# Patient Record
Sex: FEMALE | Race: WHITE | NOT HISPANIC OR LATINO | URBAN - NONMETROPOLITAN AREA
[De-identification: names, ages, dates, MRNs, and addresses within clinical notes are randomized per-mention and may not be internally consistent; named-entity substitution may affect disease eponyms.]

---

## 2020-03-02 ENCOUNTER — APPOINTMENT (RX ONLY)
Dept: URBAN - NONMETROPOLITAN AREA CLINIC 13 | Facility: CLINIC | Age: 35
Setting detail: DERMATOLOGY
End: 2020-03-02

## 2020-03-02 DIAGNOSIS — D18.0 HEMANGIOMA: ICD-10-CM

## 2020-03-02 DIAGNOSIS — L57.8 OTHER SKIN CHANGES DUE TO CHRONIC EXPOSURE TO NONIONIZING RADIATION: ICD-10-CM

## 2020-03-02 DIAGNOSIS — L82.1 OTHER SEBORRHEIC KERATOSIS: ICD-10-CM

## 2020-03-02 DIAGNOSIS — D22 MELANOCYTIC NEVI: ICD-10-CM

## 2020-03-02 DIAGNOSIS — D485 NEOPLASM OF UNCERTAIN BEHAVIOR OF SKIN: ICD-10-CM | Status: WORSENING

## 2020-03-02 PROBLEM — D22.39 MELANOCYTIC NEVI OF OTHER PARTS OF FACE: Status: ACTIVE | Noted: 2020-03-02

## 2020-03-02 PROBLEM — D18.01 HEMANGIOMA OF SKIN AND SUBCUTANEOUS TISSUE: Status: ACTIVE | Noted: 2020-03-02

## 2020-03-02 PROBLEM — D48.5 NEOPLASM OF UNCERTAIN BEHAVIOR OF SKIN: Status: ACTIVE | Noted: 2020-03-02

## 2020-03-02 PROBLEM — D22.4 MELANOCYTIC NEVI OF SCALP AND NECK: Status: ACTIVE | Noted: 2020-03-02

## 2020-03-02 PROCEDURE — ? BIOPSY BY SHAVE METHOD

## 2020-03-02 PROCEDURE — 11102 TANGNTL BX SKIN SINGLE LES: CPT

## 2020-03-02 PROCEDURE — ? COUNSELING

## 2020-03-02 PROCEDURE — 99203 OFFICE O/P NEW LOW 30 MIN: CPT | Mod: 25

## 2020-03-02 ASSESSMENT — LOCATION DETAILED DESCRIPTION DERM
LOCATION DETAILED: RIGHT INFERIOR UPPER BACK
LOCATION DETAILED: LEFT BUTTOCK
LOCATION DETAILED: RIGHT SUPERIOR MEDIAL MIDBACK
LOCATION DETAILED: RIGHT CENTRAL PARIETAL SCALP
LOCATION DETAILED: RIGHT SUPERIOR CENTRAL MALAR CHEEK
LOCATION DETAILED: RIGHT INFERIOR CENTRAL MALAR CHEEK
LOCATION DETAILED: LEFT MID-UPPER BACK
LOCATION DETAILED: EPIGASTRIC SKIN
LOCATION DETAILED: INFERIOR THORACIC SPINE
LOCATION DETAILED: LEFT INFERIOR CENTRAL MALAR CHEEK

## 2020-03-02 ASSESSMENT — LOCATION SIMPLE DESCRIPTION DERM
LOCATION SIMPLE: ABDOMEN
LOCATION SIMPLE: RIGHT LOWER BACK
LOCATION SIMPLE: RIGHT CHEEK
LOCATION SIMPLE: LEFT UPPER BACK
LOCATION SIMPLE: LEFT CHEEK
LOCATION SIMPLE: RIGHT UPPER BACK
LOCATION SIMPLE: UPPER BACK
LOCATION SIMPLE: LEFT BUTTOCK
LOCATION SIMPLE: SCALP
LOCATION SIMPLE: RIGHT CHEEK

## 2020-03-02 ASSESSMENT — LOCATION ZONE DERM
LOCATION ZONE: TRUNK
LOCATION ZONE: SCALP
LOCATION ZONE: FACE
LOCATION ZONE: FACE
LOCATION ZONE: TRUNK

## 2021-02-12 NOTE — PROCEDURE: BIOPSY BY SHAVE METHOD
Detail Level: Detailed
Depth Of Biopsy: dermis
Was A Bandage Applied: Yes
Size Of Lesion In Cm: 0.6
56.6
X Size Of Lesion In Cm: 0.4
Biopsy Type: H and E
Biopsy Method: Dermablade
Anesthesia Type: 1% lidocaine with epinephrine
Anesthesia Volume In Cc: 0.5
Additional Anesthesia Volume In Cc (Will Not Render If 0): 0
Hemostasis: Drysol
Wound Care: Petrolatum
Dressing: bandage
Destruction After The Procedure: No
Type Of Destruction Used: Curettage
Curettage Text: The wound bed was treated with curettage after the biopsy was performed.
Cryotherapy Text: The wound bed was treated with cryotherapy after the biopsy was performed.
Electrodesiccation Text: The wound bed was treated with electrodesiccation after the biopsy was performed.
Electrodesiccation And Curettage Text: The wound bed was treated with electrodesiccation and curettage after the biopsy was performed.
Silver Nitrate Text: The wound bed was treated with silver nitrate after the biopsy was performed.
Lab: [id number]
Lab Facility: [id number]
Consent: Written consent was obtained and risks were reviewed including but not limited to scarring, infection, bleeding, scabbing, incomplete removal, nerve damage and allergy to anesthesia.
Post-Care Instructions: I reviewed with the patient in detail post-care instructions. Patient is to keep the biopsy site dry overnight, and then apply bacitracin twice daily until healed. Patient may apply hydrogen peroxide soaks to remove any crusting.
Notification Instructions: Patient will be notified of biopsy results. However, patient instructed to call the office if not contacted within 2 weeks.
Billing Type: Third-Party Bill

## 2023-06-05 ENCOUNTER — APPOINTMENT (RX ONLY)
Dept: URBAN - METROPOLITAN AREA CLINIC 1 | Facility: CLINIC | Age: 38
Setting detail: DERMATOLOGY
End: 2023-06-05

## 2023-06-05 DIAGNOSIS — Z71.89 OTHER SPECIFIED COUNSELING: ICD-10-CM

## 2023-06-05 DIAGNOSIS — L81.4 OTHER MELANIN HYPERPIGMENTATION: ICD-10-CM

## 2023-06-05 DIAGNOSIS — D22 MELANOCYTIC NEVI: ICD-10-CM

## 2023-06-05 DIAGNOSIS — L57.8 OTHER SKIN CHANGES DUE TO CHRONIC EXPOSURE TO NONIONIZING RADIATION: ICD-10-CM

## 2023-06-05 DIAGNOSIS — D18.0 HEMANGIOMA: ICD-10-CM

## 2023-06-05 DIAGNOSIS — L82.1 OTHER SEBORRHEIC KERATOSIS: ICD-10-CM

## 2023-06-05 PROBLEM — D18.01 HEMANGIOMA OF SKIN AND SUBCUTANEOUS TISSUE: Status: ACTIVE | Noted: 2023-06-05

## 2023-06-05 PROBLEM — D22.5 MELANOCYTIC NEVI OF TRUNK: Status: ACTIVE | Noted: 2023-06-05

## 2023-06-05 PROCEDURE — ? COUNSELING

## 2023-06-05 PROCEDURE — ? TREATMENT REGIMEN

## 2023-06-05 PROCEDURE — 99203 OFFICE O/P NEW LOW 30 MIN: CPT

## 2023-06-05 ASSESSMENT — LOCATION DETAILED DESCRIPTION DERM
LOCATION DETAILED: LEFT SUPERIOR UPPER BACK
LOCATION DETAILED: RIGHT SUPERIOR UPPER BACK
LOCATION DETAILED: RIGHT INFERIOR UPPER BACK
LOCATION DETAILED: LEFT MID-UPPER BACK
LOCATION DETAILED: RIGHT MID-UPPER BACK
LOCATION DETAILED: RIGHT LABIUM MAJUS
LOCATION DETAILED: RIGHT LABIUM MINUS

## 2023-06-05 ASSESSMENT — LOCATION SIMPLE DESCRIPTION DERM
LOCATION SIMPLE: RIGHT UPPER BACK
LOCATION SIMPLE: LABIA MINORA
LOCATION SIMPLE: LEFT UPPER BACK
LOCATION SIMPLE: LABIA MAJORA

## 2023-06-05 ASSESSMENT — LOCATION ZONE DERM
LOCATION ZONE: VULVA
LOCATION ZONE: TRUNK

## 2024-04-25 ENCOUNTER — APPOINTMENT (RX ONLY)
Dept: URBAN - NONMETROPOLITAN AREA CLINIC 13 | Facility: CLINIC | Age: 39
Setting detail: DERMATOLOGY
End: 2024-04-25

## 2024-04-25 DIAGNOSIS — D485 NEOPLASM OF UNCERTAIN BEHAVIOR OF SKIN: ICD-10-CM

## 2024-04-25 PROBLEM — D48.5 NEOPLASM OF UNCERTAIN BEHAVIOR OF SKIN: Status: ACTIVE | Noted: 2024-04-25

## 2024-04-25 PROCEDURE — ? COUNSELING

## 2024-04-25 PROCEDURE — ? ADDITIONAL NOTES

## 2024-04-25 PROCEDURE — ? TREATMENT REGIMEN

## 2024-04-25 PROCEDURE — 99212 OFFICE O/P EST SF 10 MIN: CPT

## 2024-04-25 ASSESSMENT — LOCATION DETAILED DESCRIPTION DERM: LOCATION DETAILED: RIGHT UPPER CUTANEOUS LIP

## 2024-04-25 ASSESSMENT — LOCATION ZONE DERM: LOCATION ZONE: LIP

## 2024-04-25 ASSESSMENT — LOCATION SIMPLE DESCRIPTION DERM: LOCATION SIMPLE: RIGHT LIP

## 2024-04-25 NOTE — PROCEDURE: TREATMENT REGIMEN
Plan: Start triamcinolone BID x 10-14 days, AE and correct way to apply medication reviewed. Start gentle skin care routine. Recommended vanicream products. Follow up in 2 weeks consider bx at that time.
Detail Level: Zone

## 2024-04-25 NOTE — PROCEDURE: COUNSELING
Detail Level: Detailed
Patient Specific Counseling (Will Not Stick From Patient To Patient): -Given a sample of Triamcinolone

## 2024-04-25 NOTE — PROCEDURE: ADDITIONAL NOTES
Additional Notes: Since erythematous lesion to the R upper lip presented 2 weeks ago while traveling. 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 No relief. Lesion persisted. She has been applying topical triple abx ointment with some improvement. No known hx of eczema, psoriasis. No chronic medical conditions/new medications. No known injury.
Render Risk Assessment In Note?: no
Detail Level: Simple

## 2024-04-25 NOTE — HPI: SKIN LESION
Is This A New Presentation, Or A Follow-Up?: Skin Lesion
What Type Of Note Output Would You Prefer (Optional)?: Standard Output
Additional History: Pt. Has put triple antibiotic ointment and a scar patch on the area. 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

## 2024-05-09 ENCOUNTER — APPOINTMENT (RX ONLY)
Dept: URBAN - NONMETROPOLITAN AREA CLINIC 13 | Facility: CLINIC | Age: 39
Setting detail: DERMATOLOGY
End: 2024-05-09

## 2024-05-09 DIAGNOSIS — L30.9 DERMATITIS, UNSPECIFIED: ICD-10-CM

## 2024-05-09 PROCEDURE — 99212 OFFICE O/P EST SF 10 MIN: CPT

## 2024-05-09 PROCEDURE — ? COUNSELING

## 2024-05-09 PROCEDURE — ? PRESCRIPTION

## 2024-05-09 PROCEDURE — ? TREATMENT REGIMEN

## 2024-05-09 PROCEDURE — ? ADDITIONAL NOTES

## 2024-05-09 RX ORDER — DOXYCYCLINE HYCLATE 50 MG/1
1 TABLET TABLET, FILM COATED ORAL QD
Qty: 28 | Refills: 0 | Status: ERX | COMMUNITY
Start: 2024-05-09

## 2024-05-09 RX ADMIN — DOXYCYCLINE HYCLATE 1 TABLET: 50 TABLET, FILM COATED ORAL at 00:00

## 2024-05-09 ASSESSMENT — SEVERITY ASSESSMENT: SEVERITY: ALMOST CLEAR

## 2024-05-09 ASSESSMENT — PAIN INTENSITY VAS: HOW INTENSE IS YOUR PAIN 0 BEING NO PAIN, 10 BEING THE MOST SEVERE PAIN POSSIBLE?: NO PAIN

## 2024-05-09 ASSESSMENT — BSA RASH: BSA RASH: 1

## 2024-05-09 ASSESSMENT — LOCATION SIMPLE DESCRIPTION DERM: LOCATION SIMPLE: RIGHT LIP

## 2024-05-09 ASSESSMENT — ITCH NUMERIC RATING SCALE: HOW SEVERE IS YOUR ITCHING?: 0

## 2024-05-09 ASSESSMENT — LOCATION ZONE DERM: LOCATION ZONE: LIP

## 2024-05-09 ASSESSMENT — LOCATION DETAILED DESCRIPTION DERM: LOCATION DETAILED: RIGHT UPPER CUTANEOUS LIP

## 2024-05-09 NOTE — PROCEDURE: TREATMENT REGIMEN
Detail Level: Zone
Initiate Treatment: Recommend protecting the spot from the sun to prevent pigment changes

## 2024-05-09 NOTE — PROCEDURE: ADDITIONAL NOTES
Additional Notes: Pt has been applying triamcinolone BID x 7 days with improvement. Completed course of valtrex without improvement. Hx of impetigo was previously applying mupirocin with little improvement. \\n\\nPt is leaving to [place] in 1.5 week and will email update prior to leave. She can also contact the clinic for follow up prior to leaving if sx worsen. \\n\\nConsider bx if no improvement.
Render Risk Assessment In Note?: no
Detail Level: Simple

## 2024-05-15 ENCOUNTER — APPOINTMENT (RX ONLY)
Dept: URBAN - NONMETROPOLITAN AREA CLINIC 13 | Facility: CLINIC | Age: 39
Setting detail: DERMATOLOGY
End: 2024-05-15

## 2024-05-15 DIAGNOSIS — Z41.9 ENCOUNTER FOR PROCEDURE FOR PURPOSES OTHER THAN REMEDYING HEALTH STATE, UNSPECIFIED: ICD-10-CM

## 2024-05-15 PROCEDURE — ? BOTOX

## 2024-05-15 PROCEDURE — ? ADDITIONAL NOTES

## 2024-05-15 PROCEDURE — ? COSMETIC CONSULTATION: BOTULINUM TOXIN

## 2024-05-15 NOTE — PROCEDURE: BOTOX
Lcl Root Units: 0
Additional Area 2 Location: lip upper
Show Periorbital Units: Yes
Post-Care Instructions: Patient instructed to not lie down for 4 hours and limit physical activity for 24 hours. Patient instructed not to travel by airplane for 48 hours.
Show Mentalis Units: No
Dilution (U/0.1 Cc): 1
Additional Area 6 Location: R anterior platysmal band
Consent: Written consent obtained. Risks include but not limited to lid/brow ptosis, bruising, swelling, diplopia, temporary effect, incomplete chemical denervation.
Additional Area 1 Location: Lateral  brows
Glabellar Complex Units: 15
Additional Area 3 Location: chin
Detail Level: Zone
Forehead Units: 3
Price (Use Numbers Only, No Special Characters Or $): 940
Lot #: 
Expiration Date (Month Year): 5/26
Additional Area 4 Location: under lower lash line
Incrementing Botox Units: By 0.5 Units
Additional Area 5 Location: gummy smile 2 u R, 1u L

## 2024-05-15 NOTE — PROCEDURE: ADDITIONAL NOTES
Render Risk Assessment In Note?: no
Detail Level: Simple
Additional Notes: Dr. Martinez was consulted and he agrees with treatment plan as per written protocol.

## 2024-08-27 ENCOUNTER — APPOINTMENT (RX ONLY)
Dept: URBAN - NONMETROPOLITAN AREA CLINIC 13 | Facility: CLINIC | Age: 39
Setting detail: DERMATOLOGY
End: 2024-08-27

## 2024-08-27 DIAGNOSIS — Z41.9 ENCOUNTER FOR PROCEDURE FOR PURPOSES OTHER THAN REMEDYING HEALTH STATE, UNSPECIFIED: ICD-10-CM

## 2024-08-27 PROCEDURE — ? BOTOX

## 2024-08-27 NOTE — PROCEDURE: BOTOX
R Brow Units: 0
Show Orbicularis Oculi Units: Yes
Glabellar Complex Units: 15
Additional Area 3 Location: chin
Additional Area 6 Location: FROYLAN MENON
Dilution (U/0.1 Cc): 1
Show Right And Left Periorbital Units: No
Additional Area 2 Location: lip upper
Expiration Date (Month Year): 8/26
Price (Use Numbers Only, No Special Characters Or $): 392
Additional Area 1 Location: Lateral  brows
Additional Area 5 Location: corners of the nose for gummy smile
Incrementing Botox Units: By 0.5 Units
Consent: Written consent obtained. Risks include but not limited to lid/brow ptosis, bruising, swelling, diplopia, temporary effect, incomplete chemical denervation.
Inferior Lateral Orbicularis Oculi Units: 10
Post-Care Instructions: Patient instructed to not lie down for 4 hours and limit physical activity for 24 hours. Patient instructed not to travel by airplane for 48 hours.
Additional Area 4 Location: under lower lash line
Forehead Units: 3
Detail Level: Zone
Lot #:

## 2025-07-15 ENCOUNTER — APPOINTMENT (OUTPATIENT)
Dept: URBAN - NONMETROPOLITAN AREA CLINIC 13 | Facility: CLINIC | Age: 40
Setting detail: DERMATOLOGY
End: 2025-07-15

## 2025-07-15 DIAGNOSIS — Z41.9 ENCOUNTER FOR PROCEDURE FOR PURPOSES OTHER THAN REMEDYING HEALTH STATE, UNSPECIFIED: ICD-10-CM

## 2025-07-15 PROCEDURE — ? COSMETIC CONSULTATION: BOTULINUM TOXIN

## 2025-07-15 PROCEDURE — ? ADDITIONAL NOTES

## 2025-07-15 PROCEDURE — ? BOTOX

## 2025-07-15 NOTE — PROCEDURE: BOTOX
Anterior Platysmal Bands Units: 0
Price (Use Numbers Only, No Special Characters Or $): 649
Show Additional Area 5: Yes
Additional Area 3 Location: [facility] [place]
Lot #: 
Dilution (U/0.1 Cc): 1
Consent: Written consent obtained. Risks include but not limited to lid/brow ptosis, bruising, swelling, diplopia, temporary effect, incomplete chemical denervation.
Show Ucl Units: No
Additional Area 2 Location: lip upper
Additional Area 6 Location: Lateral brows
Post-Care Instructions: Patient instructed to not lie down for 4 hours and limit physical activity for 24 hours. Patient instructed not to travel by airplane for 48 hours.
Incrementing Botox Units: By 0.5 Units
Additional Area 5 Location: chin
Detail Level: Zone
Glabellar Complex Units: 15
Additional Area 4 Location: FROYLAN MENON
Expiration Date (Month Year): 6/27
Forehead Units: 3

## 2025-07-15 NOTE — PROCEDURE: ADDITIONAL NOTES
Detail Level: Simple
Render Risk Assessment In Note?: no
Additional Notes: Laura Boogie NP was consulted and she agrees with treatment plan as per written protocol.